# Patient Record
Sex: MALE | Race: WHITE | NOT HISPANIC OR LATINO | URBAN - METROPOLITAN AREA
[De-identification: names, ages, dates, MRNs, and addresses within clinical notes are randomized per-mention and may not be internally consistent; named-entity substitution may affect disease eponyms.]

---

## 2024-03-14 ENCOUNTER — TRANSFERRED RECORDS (OUTPATIENT)
Dept: HEALTH INFORMATION MANAGEMENT | Facility: CLINIC | Age: 64
End: 2024-03-14

## 2024-03-19 ENCOUNTER — MEDICAL CORRESPONDENCE (OUTPATIENT)
Dept: HEALTH INFORMATION MANAGEMENT | Facility: CLINIC | Age: 64
End: 2024-03-19

## 2024-04-11 ENCOUNTER — TRANSCRIBE ORDERS (OUTPATIENT)
Dept: OTHER | Age: 64
End: 2024-04-11

## 2024-04-11 DIAGNOSIS — H90.3 ASYMMETRICAL SENSORINEURAL HEARING LOSS: ICD-10-CM

## 2024-04-11 DIAGNOSIS — D33.3 VESTIBULAR SCHWANNOMA (H): Primary | ICD-10-CM

## 2024-04-16 ENCOUNTER — PRE VISIT (OUTPATIENT)
Dept: NEUROSURGERY | Facility: CLINIC | Age: 64
End: 2024-04-16
Payer: COMMERCIAL

## 2024-04-16 NOTE — TELEPHONE ENCOUNTER
Please obtain all brain imaging and referral notes/records.     Radha/Germain, this looks like it's a referral for Lateral Skull Base, FYI.       --------------------------------------------------      Action 4/16/24 MV 7.47am   Action Taken Referring: Dr. Ana Bruno  Clinic: [facility]    1) Records pulled in Care Everywhere  2) Imaging request faxed to [facility] for:  MRI IAC 3/13/24  CT Temporal Bones IAC 3/13/24     Action 4/19/24 MV 7am   Action Taken Images resolved in PACS

## 2024-04-17 NOTE — TELEPHONE ENCOUNTER
"FUTURE VISIT INFORMATION      FUTURE VISIT INFORMATION:  Date: 5/14/24  Time: 2:30 PM with Carlita  [place] [facility] [place]  REFERRAL INFORMATION:  Referring provider:  Christiana Bruno PA-C    Referring providers clinic:  [facility]  Reason for visit/diagnosis:  Vestibular schwannoma - Referred by CHRISTIANA BRUNO affiliated with [facility]     RECORDS REQUESTED FROM      Clinic name Comments Records Status Imaging Status   [facility] 3/19/24 referral      3/7/24 audio note- Jyothi Gifford, AuD   2/27/24 ENT note -Christiana Bruno PA-C  CE    [facility]  3/13/24 CT temporal CE PACS   [facility] 3/14/24 audiogram Scanned in CE in \"other results\" tab            "

## 2024-05-07 ENCOUNTER — TELEPHONE (OUTPATIENT)
Dept: OTOLARYNGOLOGY | Facility: CLINIC | Age: 64
End: 2024-05-07
Payer: COMMERCIAL

## 2024-05-07 NOTE — TELEPHONE ENCOUNTER
[facility] Call Center    Phone Message    May a detailed message be left on voicemail: yes     Reason for Call: Other: Pt calling in regards to upcoming appt. Requesting CPT code to be sent through [facility]. Please advise. Thanks.     Action Taken: Other: ENT    Travel Screening: Not Applicable

## 2024-05-10 DIAGNOSIS — Z01.10 ENCOUNTER FOR HEARING TEST: Primary | ICD-10-CM

## 2024-05-10 NOTE — PROGRESS NOTES
[facility]  Department of Neurosurgery    Name: Chele Lopez  MRN: [id number]  : 1960     Reason for visit: right vestibular schwannoma, new patient visit    Dear Dr. Grijalva,    It was a pleasure to see Mr. Lopez in the [facility] today.  I have reviewed the referral notes and imaging and have summarized our visit here. As you recall, Mr. Lopez is a pleasant 64 year-old male who developed right hearing loss and was found with a vestibular schwannoma for which he is referred. He has noticed long standing right hearing loss in particular firing a nail gun over his right ear several years ago. Tinnitus is frequent. Feels imbalance while up on a ladder.    Review of Systems:   Pertinent items are noted in HPI or as in patient entered ROS below, remainder of complete ROS is negative.      Allergies: hayfever, pollens      Past Medical History:hearing loss and vestibular schwannoma     Social History: Retired from a self-owed construction company, , lifetime nonsmoker.     Physical Exam:   General: No acute distress.   Head: No signs of trauma.    Eyes: Conjunctivae are normal.  Mouth/Throat: Oropharynx moist.  Neck: Normal range of motion.    Resp: No respiratory distress.   MSK: Moves all extremities.  No obvious deformity.  Neuro: The patient is fully oriented and quite pleasant. Speech normal. Extraocular movements are intact without nystagmus. Facial sensation is intact in V1, V2, V3 distributions. Facial nerve function is normal, rated as a House Brackmann 1, without synkinesis.  Palate is symmetric. Shoulders are full strength. Tongue is midline. There is no pronator drift. Full strength in all extremities. Sensation intact throughout. No dysmetria with finger-nose-finger testing.   Psych: Normal mood and affect. Behavior is normal.      Audiogram:  We reviewed the audiogram today which shows:  Right PTA >67  dB, WRS 12 % at 85 dB  Left PTA 45 dB, WRS 80 % at 75 dB    Imaging:  We reviewed the MRI scan from 3/13/2024 that shows a 12 x 5mm right internal auditory canal mass consistent with a vestibular schwannoma. No ventriculomegaly.     Assessment:  Right vestibular schwannoma  2.  AAO HNS hearing class D on the right    Plan:  We reviewed the patient's history, imaging, natural history and expected outcomes of conservative management and intervention. Options include observation, radiosurgery, microsurgical resection and we reviewed the risks and benefits of each approach. Given the small size and hearing function, we believe observation is an appropriate next step.  We would like to see him back in 6 months from the last MRI (Sept 2024) and audiogram. He will think about whether he would like to do this in person or virtually at that time.  Dr. Guerrero discussed having an evaluation of a CROS system for him  We encouraged Mr. Lopez to contact with any questions or concerns or if we may be of assistance in any way.      It was a pleasure to participate in the care of your patient. Please feel free to contact me at any point if I can be of any assistance for Mr. Lopez.    Sincerely,  Chiki Zazueta MD       30 minutes spent on the date of the encounter doing chart review, review of outside records, review of test results, interpretation of tests, patient visit, documentation and discussion with other provider(s)

## 2024-05-13 NOTE — PROGRESS NOTES
"  [facility]      Name: Chele Lopez  MRN: [id number]  Age: 64 year old  : 1960  Referring provider: Manda Guerrero  2024      Chief Complaint:   Consultation regarding vestibular schwannoma    History of Present Illness:   Chele Lopez is a 64 year old male from [facility], with a history of aysmmetrical hearing loss, who was seen in the [facility] for consultation requested by Manda Guerrero regarding vestibular schwannoma.  He is seen in conjunction with my colleague in neurosurgery, Dr. Chiki Zazueta.     This schwannoma was discovered when the patient started Medicare and inquired about hearing aids. At that time the provider obtained an audiogram that revealed profound hearing loss in the right side, and severe hearing loss in the left with >30% difference between the ears, and 12% word recognition on the right vs. 80% on the left. This prompted an MRI that revealed a R. Vestibular schwannoma. He says he has been hard of hearing on his right side for years when he was walking up a ladder and a pneumatic nail gun went off multiple times by his ear. He fell about 8ft off the ladder and his right ear bled for 1 week. Ever since this incident he has had sound hyperacusis, and decreased hearing on the right. He states that he has had tinnitus since he was 14 and it does not bother him. He denies vertigo, he has occasional unsteadiness, but has not fallen. He rarely gets headaches, only when his right ear feels \"more sensitive.\" Today his main question is addressing the tumor, and the next steps.    3/19/24 referral - Ana Bruno PA-C   Diagnosis:    H90.3 (ICD-10-CM) - Asymmetrical sensorineural hearing loss   D33.3 (ICM-10-CM) - Vestibular Schwannoma (HCC)    Note: If referring outisde of the health system, please  patient of potential out-of-pcoket expenses.    Clinical Indication: vestibular " schwannoma on MRI IAC; refer to neuro-otology at [facility]    2/27/24 ENT note -Ana Bruno PA-C  Chief Complaint: Noise induced hearing loss of L ear with restricted hearing of R ear, R ear pain     History of Present Illness:  Chele Lopez is a(n) 63 year old who presents for evaluation of hearing loss and hyperacuity. He relates long standing hearing troubles - about 5 years ago he had a noise injury at work, stepped into an area were they were using a nail gun and it fired several times near hs right side. He had pain, decrease in hearing, and blood on his pillow for several nights after. Now he relates poor hearing if there is any background noise. He uses headphones to listen to the TV. He describes marked hearing acuity and wears an ear plug 24/7. Paradoxically he can hear when he scratches his beard, doesn't relate hearing eye blinking. He always has tinnitus. He has used 'axil' sporting ear plugs which seemed to help.    History of environmental allergies.    Physical Exam:  Pleasant, cooperative man in no acute distress. Skin is warm and dry. Facial features are symmetric. Voice is clear. Ear canals are patent and dry he removes ear plug before exam on right. Tympanic membranes are intact, translucent, pearly with clear appearing middle ears.     Audiometry - from 2022: right, mild to profound SNHL with poor WRS; left, normal to severe SNHL with good WRS  Tympanometry: type A bilaterally     Assessment:  (H90.3) Asymmetrical sensorineural hearing loss (primary encounter diagnosis)  (H93.13) Tinnitus, bilateral  (H83.3X1) Sound sensitivity in right ear  (W42.9XXA) Exposure to noise causing accidental injury     Plan:  Reviewed history and exam findings with Chele and his spouse. Consideration for semicircular canal dehiscence - low risk, but consideration with hyperacuity. Reviewed limited treatment, but diagnosis could provide him some answers. Reviewed recommendation made by   Valdez for MRI, he would be interested in this. Recommend hearing test as scheduled in March - would also like to review these results. Recommend bicros type hearing aid. Consideration down the road for cochlear implant.    3/7/23 audio note- Jyothi Gifford, Freda  AUDIOLOGY - TENA KELLOGG      Subjective:  ANDIE KHAN is a 62 y.o. male seen on this date for a self-referred Audiological evaluation. Patient's Primary Care Provider is MD Kelby. Today's appointment is part of a disabilities evaluation through the [employer] [facility] ([facility]) (case #[id number]). Andie was accompanied to today's appointment by his wife, Prachi. They are traveling from [place], [place].      Reason for referral: audiological evaluation as part of a disability determination claim through the DDB (case #[id number]). No previous audiologic history on file through [facility].      Andie reports having a longstanding history of bilateral hearing loss. Initially, his hearing loss was gradually occurring over the course of several years. At that time he felt that his left ear was worse in hearing acuity. Andie reports wearing 3 different pairs of hearing aids over the past 15 years. He did not necessarily perceive any significant benefit while wearing these devices. He endorses longstanding bilateral tinnitus, which has been present since he was a teenager. He describes his tinnitus as having a constant, mid-pitched ringing sound quality. He does find that it temporarily worsens when exposed to loud noises.     Unfortunately, Andie experienced a traumatic acoustic event approximately 3 years ago. 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 He states that an employee used a pneumatic gun and due to the acoustic environment/reverberation of this loud impulse sound it severely impacted how he heard from his  right ear. Chele finds that immediately after this incident he felt he could no longer hear anything from his right ear. He did notice that his ear was bleeding and felt that it was likely due to a tympanic membrane rupture, but never had this formally evaluated. He states that this bleeding was present for about 2-3 weeks before self-resolving. Chele mentions that he did also experience quite a harsh increase in the severity of his tinnitus, which also took about 2-3 weeks before returning to its previous baseline. Chele never experienced any significant improvement in hearing and continues to feel that his right ear is worse in hearing acuity to this day. Since this incident he has also experienced significant sound sensitivity, even when listening to normal/average volume sounds, and finds that he is able to function to a more  normal  degree when wearing an ear plug in his right ear. Chele also mentions that despite his severe hearing loss in his right ear, he finds that he is hearing certain sounds in an abnormal way (I.e. sounds from scratching his face, feels like he is hearing this to a magnified level).  Although vertigo/dizziness was denied, he does feel that his balance was negatively affected. He brings with him a copy of a hearing test completed in February 2022 at [facility] in [place], [place]. Chele reports never being referred to or having seen an Otolaryngology provider in the past.     Since the acoustic accident Chele has been doubling down on wearing hearing protection and transitioning all power tools to electric as much as possible in an effort to preserve what hearing he has left. He has been using a set of TV ears which he reports gives him  perfect  hearing when using them. Chele finds that he heavily relies on facial cues in nearly all conversations, but especially while in the presence of background noise. Family history of hearing loss included his father.  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 Otalgia, otorrhea, aural fullness, dizziness, and history of otologic surgery were all denied.     Having met criteria, a Clinic Standing Order for Audiology Hearing Evaluation was initiated. Patient has Otologic concern of hearing loss. An Audiological evaluation was completed.      Objective:  Otoscopy: clear ear canals, bilaterally.   Tympanometry: Type A (normal) tympanogram (normal mobility, EC volume and ME pressure), bilaterally.   Hearing Aid(s): Previous user of hearing aids. Reportedly has used 3 sets over the past 15 years with minimal perceived benefit.   Audiogram: See audiology record below for configuration. His 3-frequency pure tone averages were 73 dB HL, right ear, and 40 dB HL, left ear, which were in good agreement with speech reception thresholds (SRTs) suggesting good test reliability. His word recognition was poor with a score of 16% correct, right ear, and good with a score of 84% correct, left ear.      Assessment:  Tympanometry suggested intact and mobile tympanic membranes/middle ear systems with normal middle ear pressure (normal middle ear function) for both ears.  Mild to profound sensorineural hearing loss, right ear. Decline in threshold when compared to previous testing completed in February 20[facility] in [place], [place].  Normal peripheral hearing sensitivity through 1000 Hz, sloping to a moderate to severe sensorineural hearing loss involving 3648-2824 Hz, left ear. Thresholds are essentially stable when compared to previous [facility] [place].  Asymmetries of 15 dB HL or greater present across all test frequencies, right ear worse.   Asymmetric word recognition, right ear worse. Decline in performance, right ear, when compared to previous testing completed in February 2022  at [facility] in [place], [place].     Plan:  Findings reviewed and discussed. Recommendations include:     Prachi Elaine, and I thoroughly discussed all aspects of today's test results. I strongly recommend consulting with an Otolaryngology provider for greater medical consideration given the asymmetry present and continued change in threshold/word discrimination performance. As today's testing is part of a disabilities determination claim, we are unfortunately unable to schedule this visit through our ENT department at this time. However, our thorough report and recommendations will be provided in formal claim paperwork which will strongly encourage the DDB to assist Chele in coordinating this consultation under his current disability claim. Chele and Prachi expressed understanding and were pleased with this plan.  We discussed communication strategies to improve his speech understanding in difficult listening environments. He was given a copy of the [facility] Helpful Hints For Improving Communication Handout.  Pending medical treatment options first, medical clearance, and his perception of his hearing for communication purposes following any available medical treatment, Chele Lopez may be a candidate for a trial with amplification at some point in the future. Chele was encouraged to schedule a hearing aid consult if he is still noticing a significant hearing handicap following any recommended medical treatment by ENT. Should there not be any medical treatment options available and should Chele decide to explore other amplification options I would be happy to assist in finding a reputable audiologist or hearing care provider that would provide the necessary services for him closer to home.   We discussed good hearing conservation practices to protect his residual hearing.  Hearing should be rechecked as directed by ENT, sooner if a significant change is suspected, and as referred by his medical  providers.  Patient's primary care provider will be notified of today's findings.        Review of Systems:   Pertinent items are noted in HPI or as in patient entered ROS below, remainder of complete ROS is negative.        No data to display                 Active Medications:   No current outpatient medications on file.      Allergies:   Patient has no allergy information on record.      Past Medical History:  No past medical history on file.  There is no problem list on file for this patient.       Past Surgical History:  No past surgical history on file.    Family History:   No family history on file.      Social History:    No tobacco use  ETOH: 1-2 drinks per year       Audiogram:  AUDIOGRAM: He underwent an audiogram today (05/13/2024). This demonstrated:  RESULTS: Tymps: WNL. Reflexes: left ipsi is WNL, absent in all other conditions. Audio: Left: Normal sloping to severe rising to moderately-severe SNHL with a masking dilemma at 4 kHz, Right: Mild sloping to profound asymmetric SNHL with a conductive component at 250 Hz. Negative sol at multiple frequencies. Word rec: Left: 80%, Right: 12% (Pt. could not tolerate louder level in the right ear).      Right: Speech reception threshold is 80 dB with 12% word recognition. Tympanogram A type   Left: Speech reception threshold is 15 dB with 80% word recognition. Tympanogram A type     03/14/24 AUDIOGRAM - Viviana Koo.  AUDIOLOGY REPORT  HISTORY: Patient reports decreased hearing in the right ear for approximately 5 years after a pneumatic gun went off three times by his right without use of hearing protection. He recalls bloody otorrhea from the right ear and decreased hearing as a result f the event. At today's visit, he reports difficulty with sound directionality, conversations with the presence of background noise and hearing conversations on TV. He brought a hearing test from [facility] in [place] dated 3/7/2023 to today's  appointment. The audiogram shows an asymmetric hearing loss; right ear bring worse than the left and poor word recognition score for the right ear. He had an MRI yesterday ordered by Ana Bruno PA-C and brought the results to today's appointment. Results show a vestibular schwannoma present in the right internal auditory cnaal. This is consistent with his asymmetric hearing loss and poor word recognition ability in th eright ear. At today's appointment he denies otalgia, otorrhea, and vertigo. The patient that the noise from the MRI aggravated his tinnitus and it was more noticeable last night. He has had bilateral tinnitus since high school or college. 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 He has a pair of electronic hearing protection that he brought with to today's appointment which he has worn while hunting and on a shooting range.     RESULTS: Otoscopy reveals clear ear canals.  Tympanometry reveals a type A[d] tympanogram for the right ear and a type A tympanogram for the left ear. Audiological testing was performed with insert earphones. Results reveal a mild sloping to profound sensorineural hearing loss in the right ear and normal hearing sloping to severe rising to moderately-severe sensorineural hearing loss in the left ear. Speech Recognition Thresholds (SRTs) were obtained at 70dBHL in the right ear and 35 dBHL in the left ear. Word Recognition scores were 15% at 75dBHL fro the right ear and 76% at 40 dBHL for the left ear when speech was presented at a comfortable level. Most comfortable level for speech was obtained at 75 dBHL for the right ear and 70 dBHL for the left ear for monosyllabic words. Uncomfortable level for speech was obtained at 90 dBHL for the right ear and 100 dBHL for the left ear.     PLAN OF CARE: Results were shared with the patient. His hearing has remained stable in both ears in comparison to previous testing from March 2023. He  has not followed up with anyone on the MRI results from yesterday. He will follow up with Ana Bruno PA-C and/ or Dr. Valdez, both in ENT and return to our office as needed for hearing testing or to discuss a BICROS in more detail. A hearing aid benefit check will be performed as well.      Audiogram:  AUDIOGRAM: He underwent an audiogram 03/07/2023. This demonstrates:      Right: Speech reception threshold is 70 dB with 16% word recognition. Tympanogram A type   Left: Speech reception threshold is 30 dB with 84% word recognition. Tympanogram A type     Audiogram was independently reviewed.    Imaging:  MR IAC WO W CONTRAST (03/13/24)  HISTORY: asymmetric hearing loss;    FINDINGS:  A series of axial and coronal sequences was performed through the temporal bone concentrating on the level of the internal auditory canals including postcontrast T1-weighted images.    There is a soft tissue structure identified in the right internal auditory canal which demonstrates uniform contrast enhancement measuring 12 x 5 x 4 mm consistent with a vestibular schwannoma. No other abnormalities are appreciated. The middle ear cavities and mastoid air spaces are clear bilaterally.    IMPRESSION:  12 x 5 x 4 mm enhancing mass in the right internal auditory canal consistent with a vestibular schwannoma.     CT TEMPORAL BONE IACS MASTOIDS WO IV CONTRAST (03/13/24)  HISTORY: asymmetric hearing loss; history of noise injury; consider semicircular canal dehiscence.    FINDINGS:  Continuous axial images were performed through the skull base concentrating on the temporal bone, mastoid region and middle ear cavities. Images are displayed in soft tissue and bone window settings. Reformatted sagittal and coronal images were also obtained.    The mastoid air spaces are clear bilaterally. The middle ear cavities are also clear bilaterally with a normal and symmetrical appearance of the tympanic membrane and intact middle ear ossicles. No bone  destruction is visible. The cochlea and semicircular canals also appear normal and symmetrical. The internal auditory canals appear normal and symmetrical. Visualized brain parenchyma also appears normal.    IMPRESSION:  No CT abnormalities are identified in the temporal bone, middle ear cavities or internal auditory canals.    MRI images were independently reviewed.    Outside records from [facility] were independently reviewed.       Assessment and Plan:  Chele Lopez is a 64 year old male with a history of aysmmetrical hearing loss, who was [facility] in the [facility] for consultation regarding vestibular schwannoma.      Today we discussed the diagnosis in detail along with the natural history and the different management strategies of observation, surgery, and radiosurgery.  We reviewed the goals, range of outcomes, and the common and serious risks attendant to each approach. Together, a decision was reached to follow up in 6 months with repeat imaging and audiogram. At that time we will better determine if the tumor has changed in size and discuss further interventions. If the tumor increases in size we discussed the options of radiation therapy or surgical intervention. We thoroughly discussed the risks & benefits of each option, and the patient verbalized understanding. We will review these again if the need arises. We did encourage him to continue to seek out hearing aid devices at this time.     Follow-up: Follow up in 6 months with repeat MRI and audiogram before visit.     Alma Escobar MS3     Manda Guerrero MD  Otology & Neurotology  [facility]      Scribe Disclosure:   I, Keri Vincent, am serving as a scribe; to document services personally performed by Manda Guerrero MD -based on data collection and the provider's statements to me.     Provider Disclosure:  I agree with above History, Review of Systems, Physical exam and Plan.  I  have reviewed the content of the documentation and have edited it as needed. I have personally performed the services documented here and the documentation accurately represents those services and the decisions I have made.      I, Manda Guerrero MD, was present with the medical student who participated in the service and in the documentation of the note.  I have verified the history and personally performed the physical exam and medical decision making.  I agree with the assessment and plan of care as documented in the note.       Electronically signed by:  Manda Guerrero MD  Otology & Neurotology  [facility]

## 2024-05-14 ENCOUNTER — OFFICE VISIT (OUTPATIENT)
Dept: OTOLARYNGOLOGY | Facility: CLINIC | Age: 64
End: 2024-05-14
Payer: COMMERCIAL

## 2024-05-14 ENCOUNTER — PRE VISIT (OUTPATIENT)
Dept: OTOLARYNGOLOGY | Facility: CLINIC | Age: 64
End: 2024-05-14

## 2024-05-14 ENCOUNTER — OFFICE VISIT (OUTPATIENT)
Dept: AUDIOLOGY | Facility: CLINIC | Age: 64
End: 2024-05-14
Payer: COMMERCIAL

## 2024-05-14 VITALS
HEART RATE: 61 BPM | WEIGHT: 200 LBS | SYSTOLIC BLOOD PRESSURE: 151 MMHG | BODY MASS INDEX: 28 KG/M2 | TEMPERATURE: 97.9 F | DIASTOLIC BLOOD PRESSURE: 78 MMHG | HEIGHT: 71 IN | OXYGEN SATURATION: 98 %

## 2024-05-14 DIAGNOSIS — D33.3 VESTIBULAR SCHWANNOMA (H): ICD-10-CM

## 2024-05-14 DIAGNOSIS — H90.3 SENSORINEURAL HEARING LOSS (SNHL), BILATERAL: Primary | ICD-10-CM

## 2024-05-14 DIAGNOSIS — D33.3 ACOUSTIC NEUROMA (H): Primary | ICD-10-CM

## 2024-05-14 DIAGNOSIS — H90.3 ASYMMETRICAL SENSORINEURAL HEARING LOSS: ICD-10-CM

## 2024-05-14 PROCEDURE — 99204 OFFICE O/P NEW MOD 45 MIN: CPT | Performed by: OTOLARYNGOLOGY

## 2024-05-14 PROCEDURE — 92550 TYMPANOMETRY & REFLEX THRESH: CPT | Performed by: AUDIOLOGIST

## 2024-05-14 PROCEDURE — 92565 STENGER TEST PURE TONE: CPT | Performed by: AUDIOLOGIST

## 2024-05-14 PROCEDURE — 92557 COMPREHENSIVE HEARING TEST: CPT | Performed by: AUDIOLOGIST

## 2024-05-14 PROCEDURE — 99203 OFFICE O/P NEW LOW 30 MIN: CPT | Performed by: NEUROLOGICAL SURGERY

## 2024-05-14 ASSESSMENT — PATIENT HEALTH QUESTIONNAIRE - PHQ9
SUM OF ALL RESPONSES TO PHQ QUESTIONS 1-9: 3
10. IF YOU CHECKED OFF ANY PROBLEMS, HOW DIFFICULT HAVE THESE PROBLEMS MADE IT FOR YOU TO DO YOUR WORK, TAKE CARE OF THINGS AT HOME, OR GET ALONG WITH OTHER PEOPLE: SOMEWHAT DIFFICULT
SUM OF ALL RESPONSES TO PHQ QUESTIONS 1-9: 3
10. IF YOU CHECKED OFF ANY PROBLEMS, HOW DIFFICULT HAVE THESE PROBLEMS MADE IT FOR YOU TO DO YOUR WORK, TAKE CARE OF THINGS AT HOME, OR GET ALONG WITH OTHER PEOPLE: SOMEWHAT DIFFICULT

## 2024-05-14 ASSESSMENT — PAIN SCALES - GENERAL: PAINLEVEL: NO PAIN (0)

## 2024-05-14 NOTE — LETTER
2024       RE: Chele Lopez  [address]  [facility] [address]     Dear Colleague,    Thank you for referring your patient, Chele Lopez, to the [facility] [place] at [facility]. Please see a copy of my visit note below.    [facility], [place], operated by [facility] for Skull Base and Pituitary Surgery  Department of Neurosurgery    Name: Chele Lopez  MRN: [id number]  : 1960     Reason for visit: right vestibular schwannoma, new patient visit    Dear Dr. Grijalva,    It was a pleasure to see Mr. Lopez in the [facility] today.  I have reviewed the referral notes and imaging and have summarized our visit here. As you recall, Mr. Lopez is a pleasant 64 year-old male who developed right hearing loss and was found with a vestibular schwannoma for which he is referred. He has noticed long standing right hearing loss in particular firing a nail gun over his right ear several years ago. Tinnitus is frequent. Feels imbalance while up on a ladder.    Review of Systems:   Pertinent items are noted in HPI or as in patient entered ROS below, remainder of complete ROS is negative.      Allergies: hayfever, pollens      Past Medical History:hearing loss and vestibular schwannoma     Social History: Retired from a self-owed construction company, , lifetime nonsmoker.     Physical Exam:   General: No acute distress.   Head: No signs of trauma.    Eyes: Conjunctivae are normal.  Mouth/Throat: Oropharynx moist.  Neck: Normal range of motion.    Resp: No respiratory distress.   MSK: Moves all extremities.  No obvious deformity.  Neuro: The patient is fully oriented and quite pleasant. Speech normal. Extraocular movements are intact without nystagmus. Facial sensation is intact in V1, V2, V3 distributions. Facial nerve function is normal, rated as a House Brackmann 1, without synkinesis.  Palate is symmetric.  Shoulders are full strength. Tongue is midline. There is no pronator drift. Full strength in all extremities. Sensation intact throughout. No dysmetria with finger-nose-finger testing.   Psych: Normal mood and affect. Behavior is normal.      Audiogram:  We reviewed the audiogram today which shows:  Right PTA >67 dB, WRS 12 % at 85 dB  Left PTA 45 dB, WRS 80 % at 75 dB    Imaging:  We reviewed the MRI scan from 3/13/2024 that shows a 12 x 5mm right internal auditory canal mass consistent with a vestibular schwannoma. No ventriculomegaly.     Assessment:  Right vestibular schwannoma  2.  AAO HNS hearing class D on the right    Plan:  We reviewed the patient's history, imaging, natural history and expected outcomes of conservative management and intervention. Options include observation, radiosurgery, microsurgical resection and we reviewed the risks and benefits of each approach. Given the small size and hearing function, we believe observation is an appropriate next step.  We would like to see him back in 6 months from the last MRI (Sept 2024) and audiogram. He will think about whether he would like to do this in person or virtually at that time.  Dr. Guerrero discussed having an evaluation of a CROS system for him  We encouraged Mr. Lopez to contact with any questions or concerns or if we may be of assistance in any way.      It was a pleasure to participate in the care of your patient. Please feel free to contact me at any point if I can be of any assistance for Mr. Lopez.    Sincerely,  Chiki Zazueta MD       30 minutes spent on the date of the encounter doing chart review, review of outside records, review of test results, interpretation of tests, patient visit, documentation and discussion with other provider(s)        Again, thank you for allowing me to participate in the care of your patient.      Sincerely,    Chiki Zazueta MD

## 2024-05-14 NOTE — NURSING NOTE
"Chief Complaint   Patient presents with    Consult     Vestibular schwannoma      Blood pressure (!) 151/78, pulse 61, temperature 97.9  F (36.6  C), height 1.803 m (5' 11\"), weight 90.7 kg (200 lb), SpO2 98%.  Stalin Morales LPN    "

## 2024-05-14 NOTE — PROGRESS NOTES
AUDIOLOGY REPORT    SUMMARY: Audiology visit completed. See audiogram for results.      RECOMMENDATIONS: Follow-up with ENT.    Eros Baeza, CCC-A  Clinical Audiologist  [place] #[id number]

## 2024-05-14 NOTE — PATIENT INSTRUCTIONS
You were seen today with Dr. Manda Guerrero and Dr. Chiki Zazueta. Your primary contact after today's visit is: MICHELLE Vasquez    Next steps:  Please return to clinic in September with an updated MRI.      How to Contact Us  Send a AppointmentCityt message to your provider.   Call the clinic - your call will be routed appropriately.   ENT Clinic: [phone]   Neurosurgery Clinic: [phone]  To speak directly to an RN Care Coordinator:  Christina RN: [phone]  Radha RN: [phone]    Note: We do our best to check voicemail frequently throughout the day and make every effort to return calls within 1-2 business days. For urgent matters, please use the general clinic phone numbers listed above.

## 2024-05-14 NOTE — LETTER
[facility]  [facility] EAR NOSE AND THROAT CLINIC [address]LO[address]  [facility] [address]  Phone: [phone]  Fax: [phone]          2024    RE:   Chele Lopez  [address]  [place] [address]      Dear Colleague,    Thank you for referring your patient, Chele Lopez, to the [facility]. Please see a copy of my visit note below.      [facility] for Skull Base and Pituitary Surgery  Department of Neurosurgery    Name: Chele Lopez  MRN: [id number]  : 1960     Reason for visit: right vestibular schwannoma, new patient visit    Dear Dr. Grijalva,    It was a pleasure to see Mr. Lopez in the [facility] today.  I have reviewed the referral notes and imaging and have summarized our visit here. As you recall, Mr. Lopez is a pleasant 64 year-old male who developed right hearing loss and was found with a vestibular schwannoma for which he is referred. He has noticed long standing right hearing loss in particular firing a nail gun over his right ear several years ago. Tinnitus is frequent. Feels imbalance while up on a ladder.    Review of Systems:   Pertinent items are noted in HPI or as in patient entered ROS below, remainder of complete ROS is negative.      Allergies: hayfever, pollens      Past Medical History:hearing loss and vestibular schwannoma     Social History: Retired from a self-owed [employer] company, , lifetime nonsmoker.     Physical Exam:   General: No acute distress.   Head: No signs of trauma.    Eyes: Conjunctivae are normal.  Mouth/Throat: Oropharynx moist.  Neck: Normal range of motion.    Resp: No respiratory distress.   MSK: Moves all extremities.  No obvious deformity.  Neuro: The patient is fully oriented and quite pleasant. Speech normal. Extraocular movements are intact without nystagmus. Facial sensation  is intact in V1, V2, V3 distributions. Facial nerve function is normal, rated as a House Brackmann 1, without synkinesis.  Palate is symmetric. Shoulders are full strength. Tongue is midline. There is no pronator drift. Full strength in all extremities. Sensation intact throughout. No dysmetria with finger-nose-finger testing.   Psych: Normal mood and affect. Behavior is normal.      Audiogram:  We reviewed the audiogram today which shows:  Right PTA >67 dB, WRS 12 % at 85 dB  Left PTA 45 dB, WRS 80 % at 75 dB    Imaging:  We reviewed the MRI scan from 3/13/2024 that shows a 12 x 5mm right internal auditory canal mass consistent with a vestibular schwannoma. No ventriculomegaly.     Assessment:  Right vestibular schwannoma  2.  AAO HNS hearing class D on the right    Plan:  We reviewed the patient's history, imaging, natural history and expected outcomes of conservative management and intervention. Options include observation, radiosurgery, microsurgical resection and we reviewed the risks and benefits of each approach. Given the small size and hearing function, we believe observation is an appropriate next step.  We would like to see him back in 6 months from the last MRI (Sept 2024) and audiogram. He will think about whether he would like to do this in person or virtually at that time.  Dr. Guerrero discussed having an evaluation of a CROS system for him  We encouraged Mr. Lopez to contact with any questions or concerns or if we may be of assistance in any way.      It was a pleasure to participate in the care of your patient. Please feel free to contact me at any point if I can be of any assistance for Mr. Lopez.    Sincerely,  Chiki Zazueta MD       30 minutes spent on the date of the encounter doing chart review, review of outside records, review of test results, interpretation of tests, patient visit, documentation and discussion with other provider(s)          Again, thank you for allowing me to participate  in the care of your patient.      Sincerely,    Chiki Zazueta MD

## 2024-05-14 NOTE — LETTER
"2024       RE: Chele Lopez  [address]  [facility] [address]     Dear Colleague,    Thank you for referring your patient, Chele Lopez, to the [facility] [place] [facility]. Please see a copy of my visit note below.      [facility]      Name: Chele Lopez  MRN: [id number]  Age: 64 year old  : 1960  Referring provider: Manda Guerrero  2024      Chief Complaint:   Consultation regarding vestibular schwannoma    History of Present Illness:   Chele Lopez is a 64 year old male from [facility], with a history of aysmmetrical hearing loss, who was seen in the [facility] for consultation requested by Manda Guerrero regarding vestibular schwannoma.  He is seen in conjunction with my colleague in neurosurgery, Dr. Chiki Zazueta.     This schwannoma was discovered when the patient started Medicare and inquired about hearing aids. At that time the provider obtained an audiogram that revealed profound hearing loss in the right side, and severe hearing loss in the left with >30% difference between the ears, and 12% word recognition on the right vs. 80% on the left. This prompted an MRI that revealed a R. Vestibular schwannoma. He says he has been hard of hearing on his right side for years when he was walking up a ladder and a pneumatic nail gun went off multiple times by his ear. He fell about 8ft off the ladder and his right ear bled for 1 week. Ever since this incident he has had sound hyperacusis, and decreased hearing on the right. He states that he has had tinnitus since he was 14 and it does not bother him. He denies vertigo, he has occasional unsteadiness, but has not fallen. He rarely gets headaches, only when his right ear feels \"more sensitive.\" Today his main question is addressing the tumor, and the next steps.    3/19/24 " referral - Ana Bruno PA-C   Diagnosis:    H90.3 (ICD-10-CM) - Asymmetrical sensorineural hearing loss   D33.3 (ICM-10-CM) - Vestibular Schwannoma (HCC)    Note: If referring outisde of the health system, please  patient of potential out-of-pcoket expenses.    Clinical Indication: vestibular schwannoma on MRI IAC; refer to neuro-otology at [facility]    2/27/24 ENT note -Ana Bruno PA-C  Chief Complaint: Noise induced hearing loss of L ear with restricted hearing of R ear, R ear pain     History of Present Illness:  Chele Lopez is a(n) 63 year old who presents for evaluation of hearing loss and hyperacuity. He relates long standing hearing troubles - about 5 years ago he had a noise injury at work, stepped into an area were they were using a nail gun and it fired several times near hs right side. He had pain, decrease in hearing, and blood on his pillow for several nights after. Now he relates poor hearing if there is any background noise. He uses headphones to listen to the TV. He describes marked hearing acuity and wears an ear plug 24/7. Paradoxically he can hear when he scratches his beard, doesn't relate hearing eye blinking. He always has tinnitus. He has used 'axil' sporting ear plugs which seemed to help.    History of environmental allergies.    Physical Exam:  Pleasant, cooperative man in no acute distress. Skin is warm and dry. Facial features are symmetric. Voice is clear. Ear canals are patent and dry he removes ear plug before exam on right. Tympanic membranes are intact, translucent, pearly with clear appearing middle ears.     Audiometry - from 2022: right, mild to profound SNHL with poor WRS; left, normal to severe SNHL with good WRS  Tympanometry: type A bilaterally     Assessment:  (H90.3) Asymmetrical sensorineural hearing loss (primary encounter diagnosis)  (H93.13) Tinnitus, bilateral  (H83.3X1) Sound sensitivity in right ear  (W42.9XXA) Exposure to noise  causing accidental injury     Plan:  Reviewed history and exam findings with Andie and his spouse. Consideration for semicircular canal dehiscence - low risk, but consideration with hyperacuity. Reviewed limited treatment, but diagnosis could provide him some answers. Reviewed recommendation made by Dr. Valdez for MRI, he would be interested in this. Recommend hearing test as scheduled in March - would also like to review these results. Recommend bicros type hearing aid. Consideration down the road for cochlear implant.    3/7/23 audio note- yJothi Gifford, Freda  AUDIOLOGY - TENA KELLOGG      Subjective:  ANDIE KHAN is a 62 y.o. male seen on this date for a self-referred Audiological evaluation. Patient's Primary Care Provider is MD Kelby. Today's appointment is part of a disabilities evaluation through the [employer] [place] ([employer]) (case #[id number]). Andie was accompanied to today's appointment by his wife, Prachi. They are traveling from [place], [place].      Reason for referral: audiological evaluation as part of a disability determination claim through the DDB (case #[id number]). No previous audiologic history on file through [facility].      Andie reports having a longstanding history of bilateral hearing loss. Initially, his hearing loss was gradually occurring over the course of several years. At that time he felt that his left ear was worse in hearing acuity. Andie reports wearing 3 different pairs of hearing aids over the past 15 years. He did not necessarily perceive any significant benefit while wearing these devices. He endorses longstanding bilateral tinnitus, which has been present since he was a teenager. He describes his tinnitus as having a constant, mid-pitched ringing sound quality. He does find that it temporarily worsens when exposed to loud noises.     Unfortunately, Andie experienced a traumatic acoustic event approximately 3 years ago. 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 He states that an employee used a pneumatic gun and due to the acoustic environment/reverberation of this loud impulse sound it severely impacted how he heard from his right ear. Chele finds that immediately after this incident he felt he could no longer hear anything from his right ear. He did notice that his ear was bleeding and felt that it was likely due to a tympanic membrane rupture, but never had this formally evaluated. He states that this bleeding was present for about 2-3 weeks before self-resolving. Chele mentions that he did also experience quite a harsh increase in the severity of his tinnitus, which also took about 2-3 weeks before returning to its previous baseline. Chele never experienced any significant improvement in hearing and continues to feel that his right ear is worse in hearing acuity to this day. Since this incident he has also experienced significant sound sensitivity, even when listening to normal/average volume sounds, and finds that he is able to function to a more  normal  degree when wearing an ear plug in his right ear. Chele also mentions that despite his severe hearing loss in his right ear, he finds that he is hearing certain sounds in an abnormal way (I.e. sounds from scratching his face, feels like he is hearing this to a magnified level).  Although vertigo/dizziness was denied, he does feel that his balance was negatively affected. He brings with him a copy of a hearing test completed in February 2022 at [facility] in [place], [place]. Chele reports never being referred to or having seen an Otolaryngology provider in the past.     Since the acoustic accident Chele has been doubling down on wearing hearing protection and transitioning all power tools to electric as much as possible in an effort to preserve what hearing he  has left. He has been using a set of TV ears which he reports gives him  perfect  hearing when using them. Chele finds that he heavily relies on facial cues in nearly all conversations, but especially while in the presence of background noise. Family history of hearing loss included his father.  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 Otalgia, otorrhea, aural fullness, dizziness, and history of otologic surgery were all denied.     Having met criteria, a Clinic Standing Order for Audiology Hearing Evaluation was initiated. Patient has Otologic concern of hearing loss. An Audiological evaluation was completed.      Objective:  Otoscopy: clear ear canals, bilaterally.   Tympanometry: Type A (normal) tympanogram (normal mobility, EC volume and ME pressure), bilaterally.   Hearing Aid(s): Previous user of hearing aids. Reportedly has used 3 sets over the past 15 years with minimal perceived benefit.   Audiogram: See audiology record below for configuration. His 3-frequency pure tone averages were 73 dB HL, right ear, and 40 dB HL, left ear, which were in good agreement with speech reception thresholds (SRTs) suggesting good test reliability. His word recognition was poor with a score of 16% correct, right ear, and good with a score of 84% correct, left ear.      Assessment:  Tympanometry suggested intact and mobile tympanic membranes/middle ear systems with normal middle ear pressure (normal middle ear function) for both ears.  Mild to profound sensorineural hearing loss, right ear. Decline in threshold when compared to previous testing completed in February 2022 at [facility] in [place], [place].  Normal peripheral hearing sensitivity through 1000 Hz, sloping to a moderate to severe sensorineural hearing loss involving 4588-2223 Hz, left ear. Thresholds are essentially stable when compared to  previous testing completed in February 2022 at [facility] in [place], [place].  Asymmetries of 15 dB HL or greater present across all test frequencies, right ear worse.   Asymmetric word recognition, right ear worse. Decline in performance, right ear, when compared to previous testing completed in February 2022 at [facility] in [place], [place].     Plan:  Findings reviewed and discussed. Recommendations include:     Prachi Elaine, and I thoroughly discussed all aspects of today's test results. I strongly recommend consulting with an Otolaryngology provider for greater medical consideration given the asymmetry present and continued change in threshold/word discrimination performance. As today's testing is part of a disabilities determination claim, we are unfortunately unable to schedule this visit through our ENT department at this time. However, our thorough report and recommendations will be provided in formal claim paperwork which will strongly encourage the DDB to assist Chele in coordinating this consultation under his current disability claim. Chele and Prachi expressed understanding and were pleased with this plan.  We discussed communication strategies to improve his speech understanding in difficult listening environments. He was given a copy of the [facility] Helpful Hints For Improving Communication Handout.  Pending medical treatment options first, medical clearance, and his perception of his hearing for communication purposes following any available medical treatment, Chele Lopez may be a candidate for a trial with amplification at some point in the future. Chele was encouraged to schedule a hearing aid consult if he is still noticing a significant hearing handicap following any recommended medical treatment by ENT. Should there not be any medical treatment options available and should Chele decide to explore other amplification options I would be happy to assist in finding a reputable  audiologist or hearing care provider that would provide the necessary services for him closer to home.   We discussed good hearing conservation practices to protect his residual hearing.  Hearing should be rechecked as directed by ENT, sooner if a significant change is suspected, and as referred by his medical providers.  Patient's primary care provider will be notified of today's findings.        Review of Systems:   Pertinent items are noted in HPI or as in patient entered ROS below, remainder of complete ROS is negative.        No data to display                 Active Medications:   No current outpatient medications on file.      Allergies:   Patient has no allergy information on record.      Past Medical History:  No past medical history on file.  There is no problem list on file for this patient.       Past Surgical History:  No past surgical history on file.    Family History:   No family history on file.      Social History:    No tobacco use  ETOH: 1-2 drinks per year       Audiogram:  AUDIOGRAM: He underwent an audiogram today (05/13/2024). This demonstrated:  RESULTS: Tymps: WNL. Reflexes: left ipsi is WNL, absent in all other conditions. Audio: Left: Normal sloping to severe rising to moderately-severe SNHL with a masking dilemma at 4 kHz, Right: Mild sloping to profound asymmetric SNHL with a conductive component at 250 Hz. Negative sol at multiple frequencies. Word rec: Left: 80%, Right: 12% (Pt. could not tolerate louder level in the right ear).      Right: Speech reception threshold is 80 dB with 12% word recognition. Tympanogram A type   Left: Speech reception threshold is 15 dB with 80% word recognition. Tympanogram A type     03/14/24 AUDIOGRAM - Viviana Koo.  AUDIOLOGY REPORT  HISTORY: Patient reports decreased hearing in the right ear for approximately 5 years after a pneumatic gun went off three times by his right without use of hearing protection. He recalls bloody otorrhea from  the right ear and decreased hearing as a result f the event. At today's visit, he reports difficulty with sound directionality, conversations with the presence of background noise and hearing conversations on TV. He brought a hearing test from [facility] in [place] dated 3/7/2023 to today's appointment. The audiogram shows an asymmetric hearing loss; right ear bring worse than the left and poor word recognition score for the right ear. He had an MRI yesterday ordered by Ana Bruno PA-C and brought the results to today's appointment. Results show a vestibular schwannoma present in the right internal auditory cnaal. This is consistent with his asymmetric hearing loss and poor word recognition ability in th eright ear. At today's appointment he denies otalgia, otorrhea, and vertigo. The patient that the noise from the MRI aggravated his tinnitus and it was more noticeable last night. He has had bilateral tinnitus since high school or college. 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 He has a pair of electronic hearing protection that he brought with to today's appointment which he has worn while hunting and on a shooting range.     RESULTS: Otoscopy reveals clear ear canals.  Tympanometry reveals a type A[d] tympanogram for the right ear and a type A tympanogram for the left ear. Audiological testing was performed with insert earphones. Results reveal a mild sloping to profound sensorineural hearing loss in the right ear and normal hearing sloping to severe rising to moderately-severe sensorineural hearing loss in the left ear. Speech Recognition Thresholds (SRTs) were obtained at 70dBHL in the right ear and 35 dBHL in the left ear. Word Recognition scores were 15% at 75dBHL fro the right ear and 76% at 40 dBHL for the left ear when speech was presented at a comfortable level. Most comfortable level for speech was obtained at 75 dBHL for the right ear and 70  dBHL for the left ear for monosyllabic words. Uncomfortable level for speech was obtained at 90 dBHL for the right ear and 100 dBHL for the left ear.     PLAN OF CARE: Results were shared with the patient. His hearing has remained stable in both ears in comparison to previous testing from March 2023. He has not followed up with anyone on the MRI results from yesterday. He will follow up with Ana Bruno PA-C and/ or Dr. Valdez, both in ENT and return to our office as needed for hearing testing or to discuss a BICROS in more detail. A hearing aid benefit check will be performed as well.      Audiogram:  AUDIOGRAM: He underwent an audiogram 03/07/2023. This demonstrates:      Right: Speech reception threshold is 70 dB with 16% word recognition. Tympanogram A type   Left: Speech reception threshold is 30 dB with 84% word recognition. Tympanogram A type     Audiogram was independently reviewed.    Imaging:  MR IAC WO W CONTRAST (03/13/24)  HISTORY: asymmetric hearing loss;    FINDINGS:  A series of axial and coronal sequences was performed through the temporal bone concentrating on the level of the internal auditory canals including postcontrast T1-weighted images.    There is a soft tissue structure identified in the right internal auditory canal which demonstrates uniform contrast enhancement measuring 12 x 5 x 4 mm consistent with a vestibular schwannoma. No other abnormalities are appreciated. The middle ear cavities and mastoid air spaces are clear bilaterally.    IMPRESSION:  12 x 5 x 4 mm enhancing mass in the right internal auditory canal consistent with a vestibular schwannoma.     CT TEMPORAL BONE IACS MASTOIDS WO IV CONTRAST (03/13/24)  HISTORY: asymmetric hearing loss; history of noise injury; consider semicircular canal dehiscence.    FINDINGS:  Continuous axial images were performed through the skull base concentrating on the temporal bone, mastoid region and middle ear cavities. Images are displayed in  soft tissue and bone window settings. Reformatted sagittal and coronal images were also obtained.    The mastoid air spaces are clear bilaterally. The middle ear cavities are also clear bilaterally with a normal and symmetrical appearance of the tympanic membrane and intact middle ear ossicles. No bone destruction is visible. The cochlea and semicircular canals also appear normal and symmetrical. The internal auditory canals appear normal and symmetrical. Visualized brain parenchyma also appears normal.    IMPRESSION:  No CT abnormalities are identified in the temporal bone, middle ear cavities or internal auditory canals.    MRI images were independently reviewed.    Outside records from [facility] were independently reviewed.       Assessment and Plan:  Chele Lopez is a 64 year old male with a history of aysmmetrical hearing loss, who was seen in the [facility] for consultation regarding vestibular schwannoma.      Today we discussed the diagnosis in detail along with the natural history and the different management strategies of observation, surgery, and radiosurgery.  We reviewed the goals, range of outcomes, and the common and serious risks attendant to each approach. Together, a decision was reached to follow up in 6 months with repeat imaging and audiogram. At that time we will better determine if the tumor has changed in size and discuss further interventions. If the tumor increases in size we discussed the options of radiation therapy or surgical intervention. We thoroughly discussed the risks & benefits of each option, and the patient verbalized understanding. We will review these again if the need arises. We did encourage him to continue to seek out hearing aid devices at this time.     Follow-up: Follow up in 6 months with repeat MRI and audiogram before visit.     Alma Escobar MS3     Manda Guerrero MD  Otology & Neurotology  [facility]  [place]      Scribe Disclosure:   I, Keri Vincent, am serving as a scribe; to document services personally performed by Manda Guerrero MD -based on data collection and the provider's statements to me.     Provider Disclosure:  I agree with above History, Review of Systems, Physical exam and Plan.  I have reviewed the content of the documentation and have edited it as needed. I have personally performed the services documented here and the documentation accurately represents those services and the decisions I have made.      I, Manda Guerrero MD, was present with the medical student who participated in the service and in the documentation of the note.  I have verified the history and personally performed the physical exam and medical decision making.  I agree with the assessment and plan of care as documented in the note.       Electronically signed by:  Manda Guerrero MD  Otology & Neurotology  [facility]      Again, thank you for allowing me to participate in the care of your patient.      Sincerely,    Manda Guerrero MD

## 2024-05-30 ENCOUNTER — TELEPHONE (OUTPATIENT)
Dept: OTOLARYNGOLOGY | Facility: CLINIC | Age: 64
End: 2024-05-30
Payer: COMMERCIAL

## 2024-12-15 ENCOUNTER — HEALTH MAINTENANCE LETTER (OUTPATIENT)
Age: 64
End: 2024-12-15

## 2025-01-29 ENCOUNTER — TELEPHONE (OUTPATIENT)
Dept: OTOLARYNGOLOGY | Facility: CLINIC | Age: 65
End: 2025-01-29
Payer: COMMERCIAL

## 2025-01-29 DIAGNOSIS — D33.3 VESTIBULAR SCHWANNOMA (H): Primary | ICD-10-CM

## 2025-01-29 NOTE — TELEPHONE ENCOUNTER
[facility] Call Center    Phone Message    May a detailed message be left on voicemail: yes     Reason for Call: Other: Pt is needing to schedule a skullbase follow up with Dr Guerrero and Dr Zazueta. Please reach out to pt to schedule     Action Taken: Other: [facility]    Travel Screening: Not Applicable     Date of Service:

## 2025-02-04 ENCOUNTER — DOCUMENTATION ONLY (OUTPATIENT)
Dept: OTOLARYNGOLOGY | Facility: CLINIC | Age: 65
End: 2025-02-04
Payer: COMMERCIAL

## 2025-09-04 ENCOUNTER — TELEPHONE (OUTPATIENT)
Dept: OTOLARYNGOLOGY | Facility: CLINIC | Age: 65
End: 2025-09-04
Payer: COMMERCIAL